# Patient Record
Sex: FEMALE | Race: WHITE | URBAN - METROPOLITAN AREA
[De-identification: names, ages, dates, MRNs, and addresses within clinical notes are randomized per-mention and may not be internally consistent; named-entity substitution may affect disease eponyms.]

---

## 2017-06-15 PROBLEM — R31.0 GROSS HEMATURIA: Status: ACTIVE | Noted: 2017-06-15

## 2019-04-17 ENCOUNTER — OFFICE VISIT (OUTPATIENT)
Dept: INTERNAL MEDICINE CLINIC | Age: 69
End: 2019-04-17

## 2019-04-17 VITALS
WEIGHT: 110 LBS | OXYGEN SATURATION: 98 % | SYSTOLIC BLOOD PRESSURE: 122 MMHG | HEART RATE: 60 BPM | DIASTOLIC BLOOD PRESSURE: 69 MMHG | BODY MASS INDEX: 21.6 KG/M2 | HEIGHT: 60 IN | TEMPERATURE: 98.2 F | RESPIRATION RATE: 18 BRPM

## 2019-04-17 DIAGNOSIS — M81.0 AGE-RELATED OSTEOPOROSIS WITHOUT CURRENT PATHOLOGICAL FRACTURE: ICD-10-CM

## 2019-04-17 DIAGNOSIS — J45.20 MILD INTERMITTENT ASTHMA WITHOUT COMPLICATION: ICD-10-CM

## 2019-04-17 DIAGNOSIS — R42 DIZZINESS: Primary | ICD-10-CM

## 2019-04-17 DIAGNOSIS — N20.0 CALCULUS OF KIDNEY: ICD-10-CM

## 2019-04-17 DIAGNOSIS — E78.5 DYSLIPIDEMIA: ICD-10-CM

## 2019-04-17 DIAGNOSIS — I25.10 CORONARY ARTERIOSCLEROSIS IN NATIVE ARTERY: ICD-10-CM

## 2019-04-17 DIAGNOSIS — R31.29 MICROHEMATURIA: ICD-10-CM

## 2019-04-17 NOTE — PROGRESS NOTES
Chief Complaint Patient presents with  Hypertension  Cholesterol Problem 1. Have you been to the ER, urgent care clinic since your last visit? Hospitalized since your last visit? No 
 
2. Have you seen or consulted any other health care providers outside of the [facility] since your last visit? Include any pap smears or colon screening. Yes When: Urology yesterday and Rheumatology last momth

## 2019-04-17 NOTE — PATIENT INSTRUCTIONS
Episode of dizziness>>ARRHYTHMIA>>EVENT RECORDER>>DR. PARKS Carotid Doppler will be arranged Need old records from [facility] and [id number] [facility] Need lab results

## 2019-04-17 NOTE — PROGRESS NOTES
HPI:  
 
This lady presents to the office to establish medical care without any records available for review. I queried [address] [place] [address] for information and rest of history provided by patient which was populated into EMR. Her medications were reconciled. She was recently seen by Urology for persistent painless microhematuria with findings of bilateral nephrolithiasis. She has been arranged for repeat renal ultrasound. Her prior stone analysis was consistent with CaOxalate and 2 of her sisters have problems with kidney stones as well. She had remote history of PCI/EULALIA to LAD and has been followed by Dr. Avelino Rocha. She denies any angina. The date of her last NST is not known. She reports recurring fleeing episodes of dizziness at rest not associated with diplopia, dysphagia, nausea, vomiting, ataxia, tinnitus or hearing loss. She was not aware of any palpitations. It was not worsened by movement. She remains on low dose MTX for RA and has been advised to remain on this by 2 different rheumatologists because of persistently elevated RF. She reports that her allergies have been stable along with her asthma in the absence of maintenance regimen. This usually flares when she visits [place] where they have a 2nd home. She is receiving Prolia for the treatment of osteoporosis and has supplemented this with D3 supplements and regular weight bearing exercise program. 
 
Past Medical History:  
Diagnosis Date  Asthma  Calculus of kidney  Coronary artery disease  Diverticulitis of colon  Esophageal reflux  Hypercholesterolemia  Hypertension  Kidney stone  Microhematuria 4/17/2019  Osteoarthritis  Renal cyst, left  Rheumatoid arthritis ([facility] [address] Past Surgical History:  
Procedure Laterality Date  HX CORONARY STENT PLACEMENT  10/07/2016  HX CYST REMOVAL    
 breast  
 HX LITHOTRIPSY  11/16/2016 URETEROSCOPY W/ LITHOTRIPSY  HX OTHER SURGICAL SHOULDER REPAIR  
 HX SHOULDER ARTHROSCOPY    
 HX TUBAL LIGATION    
 HX UROLOGICAL  02/24/2017 Dr. Ann Daniels, [facility], Cystoscopy, Left retrograde pyelogram, Left ureteroscopy and holmium laser lithotripsy with stone basket extraction, Left double-J stent placement Current Outpatient Medications Medication Sig  
 denosumab (PROLIA) 60 mg/mL injection 60 mg by SubCUTAneous route.  multivitamin (TAB-A-IESHA) tablet Take 1 Tab by Mouth Once a Day.  albuterol sulfate (PROVENTIL;VENTOLIN) 2.5 mg/0.5 mL nebu nebulizer solution Take  inhaled by mouth Take As Needed.  DIGOX 125 mcg tablet  atorvastatin (LIPITOR) 20 mg tablet 20 mg.  
 aspirin delayed-release 81 mg tablet Take  by mouth daily.  nitroglycerin (NITROSTAT) 0.4 mg SL tablet by SubLINGual route every five (5) minutes as needed for Chest Pain.  metoprolol tartrate (LOPRESSOR) 25 mg tablet 25 mg two (2) times a day.  methotrexate (RHEUMATREX) 2.5 mg tablet Take 2.5 mg by mouth.  folic acid (FOLVITE) 1 mg tablet Take  by mouth daily.  cholecalciferol (VITAMIN D3) 1,000 unit tablet 1,000 Units.  ALPRAZolam (XANAX) 0.25 mg tablet No current facility-administered medications for this visit. Allergies and Intolerances: Allergies Allergen Reactions  Epinephrine Other (comments) \"they gave it to me at the dentist's office and I got shaky and my blood pressure and heart rate went up\".  Tamsulosin Hives  
  itching 
itching Family History:  
Family History Problem Relation Age of Onset  Heart Disease Mother  Cancer Mother  Heart Disease Father MI  
 Other Other   
     kidney stone  Hypertension Brother  Heart Disease Sister Leyla Safer Sister Leyla Safer Sister  Heart Disease Brother  Drug Abuse Brother Social History: She  reports that she has never smoked. She has never used smokeless tobacco.  
Social History Substance and Sexual Activity Alcohol Use No  
 Alcohol/week: 0.0 oz Immunization History:       Prevnar, Pneumovax, Flu, Zostavax Diet:                                   Ornish Exercise:                           Yoga, Walking, Golf Home Environment:          Single story Occupational Risk:           No hazards Review of Systems Constitutional: Negative for chills, fever and weight loss. HENT: Positive for hearing loss. Eyes: Negative for blurred vision. Respiratory: Negative for cough, shortness of breath and wheezing. Cardiovascular: Negative for chest pain and palpitations. Gastrointestinal: Negative for abdominal pain, blood in stool, constipation, diarrhea and heartburn. Genitourinary: Negative. Musculoskeletal: Negative for joint pain. Skin: Negative for rash. Neurological: Positive for dizziness. Endo/Heme/Allergies: Negative for environmental allergies. Physical:  
Visit Vitals /69 Pulse 60 Temp 98.2 °F (36.8 °C) Resp 18 Ht 5' (1.524 m) Wt 110 lb (49.9 kg) SpO2 98% BMI 21.48 kg/m² Physical Exam  
Constitutional: She is well-developed, well-nourished, and in no distress. HENT:  
Mouth/Throat: Oropharynx is clear and moist. No oropharyngeal exudate. Eyes: Conjunctivae are normal. No scleral icterus. Neck: No JVD present. No thyromegaly present. Cardiovascular: Normal rate, regular rhythm, normal heart sounds and intact distal pulses. Pulmonary/Chest: Breath sounds normal. She has no wheezes. She has no rales. Abdominal: Soft. Bowel sounds are normal. She exhibits no mass. There is no tenderness. Musculoskeletal: She exhibits no edema. Lymphadenopathy:  
  She has no cervical adenopathy. Neurological: She is alert. She has normal strength. She displays no tremor. She has a normal Cerebellar Exam, a normal Finger-Nose-Finger Test and a normal Tandem Gait Test.  
Skin: Skin is warm and dry. Review of Data: 
Labs: Office Visit on 04/16/2019 Component Date Value Ref Range Status  Color (UA POC) 04/16/2019 Yellow   Final  
 Clarity (UA POC) 04/16/2019 Clear   Final  
 Glucose (UA POC) 04/16/2019 Negative  Negative Final  
 Bilirubin (UA POC) 04/16/2019 Negative  Negative Final  
 Ketones (UA POC) 04/16/2019 Negative  Negative Final  
 Specific gravity (UA POC) 04/16/2019 1.020  1.001 - 1.035 Final  
 Blood (UA POC) 04/16/2019 1+  Negative Final  
 pH (UA POC) 04/16/2019 7  4.6 - 8.0 Final  
 Protein (UA POC) 04/16/2019 Negative  Negative Final  
 Urobilinogen (UA POC) 04/16/2019 0.2 mg/dL  0.2 - 1 Final  
 Nitrites (UA POC) 04/16/2019 Negative  Negative Final  
 Leukocyte esterase (UA POC) 04/16/2019 Trace  Negative Final  
 
Impression/Plan: 
 Patient Active Problem List  
Diagnosis  Code  Microhematuria from recurrent stones Management per , determine if urine cytology done R31.1 Episodic dizziness with concern for arrhythmia Alert Dr. Ayleen Matthew as she will need event recorder, determine why she is on Dig R42  Coronary arteriosclerosis in native artery with PCI/EULALIA to LAD, without angina Risk reduction strategy in place, obtain copy of last NST I25.10  Dyslipidemia FLP results to be obtained E78.5  Sjogren's syndrome ([facility] [address] Management per Dr. Ray Rodriguez M35.00  Raynaud's disease secondary to RA Warming strategy I73.00  Ventricular tachycardia ([facility] [address] by history Obtain copy of prior Holter, echo and NST, determine if this occurred at time of MI, check K and Mg I47.2  Diverticulitis of colon Fluid and fiber, confirm date of last colonoscopy K57.32  
 Asthma/Allergies, stable off maintenance Caution regarding beta agonist inhaler (pro-arrhythmic) J45.909  Age related osteoporosis Continue Prolia, D3 and dietary calcium and exercise M81.0  
 Osteoarthritis Consider anti inflammatory supplement M19.90 Brayan Mancuso MD

## 2019-06-07 ENCOUNTER — OFFICE VISIT (OUTPATIENT)
Dept: INTERNAL MEDICINE CLINIC | Age: 69
End: 2019-06-07

## 2019-06-07 VITALS
TEMPERATURE: 98.4 F | RESPIRATION RATE: 18 BRPM | OXYGEN SATURATION: 99 % | BODY MASS INDEX: 21.6 KG/M2 | SYSTOLIC BLOOD PRESSURE: 113 MMHG | HEIGHT: 60 IN | HEART RATE: 60 BPM | WEIGHT: 110 LBS | DIASTOLIC BLOOD PRESSURE: 63 MMHG
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
## 2019-06-07 RX ORDER — DOXYCYCLINE 100 MG/1
100 CAPSULE ORAL 2 TIMES DAILY
Qty: 20 CAP | Refills: 0 | Status: SHIPPED | OUTPATIENT
Start: 2019-06-07 | End: 2019-08-27

## 2019-06-07 NOTE — PROGRESS NOTES
HPI:     This lady presents to the office on an urgent basis after discovery of a tick along inner thigh that was removed with tweezers 2 days ago by her . She noticed surrounding area of erythema and became concerned about possibility of Lyme. She took a picture of a partially engorged tick and I cannot identify any hallmarks as a result. She denies any fever, headache or arthralgias. She also reports that she had an episode of dizziness and activated the event recorder ordered by Dr. Charisma Pena and she has not heard from him but has an upcoming appointment to discuss this. Past Medical History:   Diagnosis Date    Asthma     Calculus of kidney     Coronary artery disease     Diverticulitis of colon     Esophageal reflux     Hypercholesterolemia     Hypertension     Kidney stone     Microhematuria 4/17/2019    Osteoarthritis     Renal cyst, left     Rheumatoid arthritis (HCC)      Past Surgical History:   Procedure Laterality Date    HX CORONARY STENT PLACEMENT  10/07/2016    HX CYST REMOVAL      breast    HX LITHOTRIPSY  11/16/2016    URETEROSCOPY W/ LITHOTRIPSY    HX OTHER SURGICAL      SHOULDER REPAIR    HX SHOULDER ARTHROSCOPY      HX TUBAL LIGATION      HX UROLOGICAL  02/24/2017    Dr. Antoine Romero, [facility], Cystoscopy, Left retrograde pyelogram, Left ureteroscopy and holmium laser lithotripsy with stone basket extraction, Left double-J stent placement      Current Outpatient Medications   Medication Sig    doxycycline (MONODOX) 100 mg capsule Take 1 Cap by mouth two (2) times a day. Indications: tick bite    denosumab (PROLIA) 60 mg/mL injection 60 mg by SubCUTAneous route.  multivitamin (TAB-A-IESHA) tablet Take 1 Tab by Mouth Once a Day.  albuterol sulfate (PROVENTIL;VENTOLIN) 2.5 mg/0.5 mL nebu nebulizer solution Take  inhaled by mouth Take As Needed.  DIGOX 125 mcg tablet     cholecalciferol (VITAMIN D3) 1,000 unit tablet 1,000 Units.     atorvastatin (LIPITOR) 20 mg tablet 20 mg.    aspirin delayed-release 81 mg tablet Take  by mouth daily.  nitroglycerin (NITROSTAT) 0.4 mg SL tablet by SubLINGual route every five (5) minutes as needed for Chest Pain.  metoprolol tartrate (LOPRESSOR) 25 mg tablet 25 mg two (2) times a day.  ALPRAZolam (XANAX) 0.25 mg tablet     methotrexate (RHEUMATREX) 2.5 mg tablet Take 2.5 mg by mouth.  folic acid (FOLVITE) 1 mg tablet Take  by mouth daily. No current facility-administered medications for this visit. Allergies and Intolerances: Allergies   Allergen Reactions    Epinephrine Other (comments)     \"they gave it to me at the dentist's office and I got shaky and my blood pressure and heart rate went up\".  Tamsulosin Hives     itching  itching     Family History:   Family History   Problem Relation Age of Onset    Heart Disease Mother     Cancer Mother     Heart Disease Father         MI    Other Other         kidney stone    Hypertension Brother     Heart Disease Sister    Waleska Fuelling Sister     Stone Sister     Heart Disease Brother     Drug Abuse Brother      Social History:   She  reports that she has never smoked. She has never used smokeless tobacco.   Social History     Substance and Sexual Activity   Alcohol Use No    Alcohol/week: 0.0 oz     Physical:   Visit Vitals  /63 (BP 1 Location: Left arm, BP Patient Position: Sitting)   Pulse 60   Temp 98.4 °F (36.9 °C) (Oral)   Resp 18   Ht 5' (1.524 m)   Wt 110 lb (49.9 kg)   SpO2 99%   BMI 21.48 kg/m²          Physical Exam   Skin:             Impression/Plan:   Patient Active Problem List   Diagnosis  Code    Tick bite Doxycycline 100 mg bid x 10 days, Lyme titer in 4 weeks, drug interaction  utilized, warned of potential side effect from Doxycycline (sun burn, GI upset, etc) W57. Crissie Pel    Dizziness likely arrhythmogenic Awaiting results of event recorder John Dey MD

## 2019-06-07 NOTE — PATIENT INSTRUCTIONS
Tick bite right inner thigh>>presumed lone star tick (partially engorged)>>Doxycycline 100mg twice a day for 10 days (may sprinkle contents into apple sauce) Check blood test for Lyme antibody in 4 weeks or greater

## 2019-06-07 NOTE — PROGRESS NOTES
Chief Complaint   Patient presents with    Tick Removal     tick was removal     1. Have you been to the ER, urgent care clinic since your last visit? Hospitalized since your last visit? No    2. Have you seen or consulted any other health care providers outside of the [address] [place] [address] since your last visit? Include any pap smears or colon screening.  No

## 2019-07-05 ENCOUNTER — HOSPITAL ENCOUNTER (OUTPATIENT)
Dept: LAB | Age: 69
Discharge: HOME | End: 2019-07-05
Payer: COMMERCIAL
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
## 2019-07-05 PROCEDURE — 86618 LYME DISEASE ANTIBODY: CPT

## 2019-07-05 PROCEDURE — 36415 COLL VENOUS BLD VENIPUNCTURE: CPT

## 2019-07-06 LAB — B BURGDOR IGG+IGM SER-ACNC: <0.91 ISR (ref 0–0.9)

## 2019-07-07 ENCOUNTER — TELEPHONE (OUTPATIENT)
Dept: INTERNAL MEDICINE CLINIC | Age: 69
End: 2019-07-07

## 2019-08-14 ENCOUNTER — HOSPITAL ENCOUNTER (OUTPATIENT)
Dept: LAB | Age: 69
Discharge: HOME | End: 2019-08-14
Payer: COMMERCIAL

## 2019-08-14 DIAGNOSIS — E78.5 DYSLIPIDEMIA: ICD-10-CM

## 2019-08-14 DIAGNOSIS — I25.10 CORONARY ARTERIOSCLEROSIS IN NATIVE ARTERY: ICD-10-CM

## 2019-08-14 DIAGNOSIS — R31.29 MICROHEMATURIA: ICD-10-CM

## 2019-08-14 DIAGNOSIS — M81.0 AGE-RELATED OSTEOPOROSIS WITHOUT CURRENT PATHOLOGICAL FRACTURE: Primary | ICD-10-CM

## 2019-08-14 DIAGNOSIS — M81.0 AGE-RELATED OSTEOPOROSIS WITHOUT CURRENT PATHOLOGICAL FRACTURE: ICD-10-CM

## 2019-08-14 LAB
25(OH)D3 SERPL-MCNC: 31.8 NG/ML (ref 30–100)
ALBUMIN SERPL-MCNC: 4 G/DL (ref 3.4–5)
ALBUMIN/GLOB SERPL: 1.5 {RATIO} (ref 0.8–1.7)
ALP SERPL-CCNC: 66 U/L (ref 45–117)
ALT SERPL-CCNC: 24 U/L (ref 13–56)
ANION GAP SERPL CALC-SCNC: 4 MMOL/L (ref 3–18)
APPEARANCE UR: CLEAR
AST SERPL-CCNC: 14 U/L (ref 10–38)
BACTERIA URNS QL MICRO: ABNORMAL /HPF
BILIRUB SERPL-MCNC: 0.6 MG/DL (ref 0.2–1)
BILIRUB UR QL: NEGATIVE
BUN SERPL-MCNC: 14 MG/DL (ref 7–18)
BUN/CREAT SERPL: 23 (ref 12–20)
CALCIUM SERPL-MCNC: 8.6 MG/DL (ref 8.5–10.1)
CHLORIDE SERPL-SCNC: 106 MMOL/L (ref 100–111)
CHOLEST SERPL-MCNC: 102 MG/DL
CK SERPL-CCNC: 28 U/L (ref 26–192)
CO2 SERPL-SCNC: 31 MMOL/L (ref 21–32)
COLOR UR: YELLOW
CREAT SERPL-MCNC: 0.62 MG/DL (ref 0.6–1.3)
EPITH CASTS URNS QL MICRO: ABNORMAL /LPF (ref 0–5)
ERYTHROCYTE [DISTWIDTH] IN BLOOD BY AUTOMATED COUNT: 13.2 % (ref 11.6–14.5)
GLOBULIN SER CALC-MCNC: 2.7 G/DL (ref 2–4)
GLUCOSE SERPL-MCNC: 91 MG/DL (ref 74–99)
GLUCOSE UR STRIP.AUTO-MCNC: NEGATIVE MG/DL
HCT VFR BLD AUTO: 42.4 % (ref 35–45)
HDLC SERPL-MCNC: 35 MG/DL (ref 40–60)
HDLC SERPL: 2.9 {RATIO} (ref 0–5)
HGB BLD-MCNC: 13.9 G/DL (ref 12–16)
HGB UR QL STRIP: ABNORMAL
KETONES UR QL STRIP.AUTO: NEGATIVE MG/DL
LDLC SERPL CALC-MCNC: 43.6 MG/DL (ref 0–100)
LEUKOCYTE ESTERASE UR QL STRIP.AUTO: ABNORMAL
LIPID PROFILE,FLP: ABNORMAL
MCH RBC QN AUTO: 31.4 PG (ref 24–34)
MCHC RBC AUTO-ENTMCNC: 32.8 G/DL (ref 31–37)
MCV RBC AUTO: 95.7 FL (ref 74–97)
NITRITE UR QL STRIP.AUTO: NEGATIVE
PH UR STRIP: 8.5 [PH] (ref 5–8)
PLATELET # BLD AUTO: 198 K/UL (ref 135–420)
PMV BLD AUTO: 13.1 FL (ref 9.2–11.8)
POTASSIUM SERPL-SCNC: 4.2 MMOL/L (ref 3.5–5.5)
PROT SERPL-MCNC: 6.7 G/DL (ref 6.4–8.2)
PROT UR STRIP-MCNC: ABNORMAL MG/DL
RBC # BLD AUTO: 4.43 M/UL (ref 4.2–5.3)
RBC #/AREA URNS HPF: ABNORMAL /HPF (ref 0–5)
SODIUM SERPL-SCNC: 141 MMOL/L (ref 136–145)
SP GR UR REFRACTOMETRY: 1.01 (ref 1–1.03)
TRIGL SERPL-MCNC: 117 MG/DL (ref ?–150)
TSH SERPL DL<=0.05 MIU/L-ACNC: 4.06 UIU/ML (ref 0.36–3.74)
UROBILINOGEN UR QL STRIP.AUTO: 1 EU/DL (ref 0.2–1)
VLDLC SERPL CALC-MCNC: 23.4 MG/DL
WBC # BLD AUTO: 6 K/UL (ref 4.6–13.2)
WBC URNS QL MICRO: ABNORMAL /HPF (ref 0–4)

## 2019-08-14 PROCEDURE — 80061 LIPID PANEL: CPT

## 2019-08-14 PROCEDURE — 85027 COMPLETE CBC AUTOMATED: CPT

## 2019-08-14 PROCEDURE — 84443 ASSAY THYROID STIM HORMONE: CPT

## 2019-08-14 PROCEDURE — 82306 VITAMIN D 25 HYDROXY: CPT

## 2019-08-14 PROCEDURE — 36415 COLL VENOUS BLD VENIPUNCTURE: CPT

## 2019-08-14 PROCEDURE — 80053 COMPREHEN METABOLIC PANEL: CPT

## 2019-08-14 PROCEDURE — 82550 ASSAY OF CK (CPK): CPT

## 2019-08-14 PROCEDURE — 81001 URINALYSIS AUTO W/SCOPE: CPT

## 2019-08-27 ENCOUNTER — OFFICE VISIT (OUTPATIENT)
Dept: INTERNAL MEDICINE CLINIC | Age: 69
End: 2019-08-27

## 2019-08-27 VITALS
HEIGHT: 60 IN | SYSTOLIC BLOOD PRESSURE: 100 MMHG | RESPIRATION RATE: 18 BRPM | WEIGHT: 107 LBS | DIASTOLIC BLOOD PRESSURE: 58 MMHG | HEART RATE: 54 BPM | BODY MASS INDEX: 21.01 KG/M2 | OXYGEN SATURATION: 98 % | TEMPERATURE: 98 F

## 2019-08-27 DIAGNOSIS — Z00.00 ENCOUNTER FOR PREVENTIVE HEALTH EXAMINATION: Primary | ICD-10-CM

## 2019-08-27 DIAGNOSIS — I25.10 CORONARY ARTERIOSCLEROSIS IN NATIVE ARTERY: ICD-10-CM

## 2019-08-27 DIAGNOSIS — I73.00 RAYNAUD'S DISEASE WITHOUT GANGRENE: ICD-10-CM

## 2019-08-27 PROBLEM — M05.741 RHEUMATOID ARTHRITIS INVOLVING BOTH HANDS WITH POSITIVE RHEUMATOID FACTOR (HCC): Status: ACTIVE | Noted: 2019-08-27

## 2019-08-27 PROBLEM — M05.742 RHEUMATOID ARTHRITIS INVOLVING BOTH HANDS WITH POSITIVE RHEUMATOID FACTOR (HCC): Status: ACTIVE | Noted: 2019-08-27
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
## 2019-08-27 PROBLEM — E03.8 SUBCLINICAL HYPOTHYROIDISM: Status: ACTIVE | Noted: 2019-08-27

## 2019-08-27 PROBLEM — I47.1 PSVT (PAROXYSMAL SUPRAVENTRICULAR TACHYCARDIA) (HCC): Status: ACTIVE | Noted: 2019-08-27

## 2019-08-27 RX ORDER — LANOLIN ALCOHOL/MO/W.PET/CERES
1000 CREAM (GRAM) TOPICAL DAILY
COMMUNITY

## 2019-08-27 NOTE — PATIENT INSTRUCTIONS
PSVT>>controlled on Digoxin    CAD>>STENT TO LAD>>monitor for angina    Cholesterol nearly perfect except HDL is low (oatmeal, salmon, almonds)    BP controlled    RA/Sjogren's/Raynauds>>Dr. Ramón Ochoa    Thyroid (borderline low) >> repeat TSH in 6 months with T4 and TPO Antibody (Hashimoto's)    Kidney stone>>water with lemon    Screening:  Up to date on mammogram, bone density, colonoscopy    Immunizations:   High dose flu vaccine in October, Shingrix

## 2019-08-27 NOTE — PROGRESS NOTES
Chief Complaint   Patient presents with    Hypertension    Cholesterol Problem       1. Have you been to the ER, urgent care clinic since your last visit? Hospitalized since your last visit? No    2. Have you seen or consulted any other health care providers outside of the [address] [place] [address] since your last visit? Include any pap smears or colon screening.  No

## 2019-08-27 NOTE — PROGRESS NOTES
HPI:     This lady presents to the office for her annual wellness visit. She had labs drawn prior to this visit which she wanted to discuss. She is under the care of Dr. Kathy Whiting for PSVT and is currently on Digoxin with good results. She also has previously been diagnosed with VT that occurred at time of ACS which does not appear to have recurred since PCI/EULALIA to LAD and diagonal bifurcation in 2016. She is tolerating Lipitor although I am not certain that her worsening arthralgias is related to the use of Lipitor. I did not check CPK with the most recent blood work. She is being followed by Dr. Wade Sher for RA and Sjogren's and remains on minimal dose of MTX. She complains of stiffness and pain in fingers of both hands especially in cold environment (visited [place]). She completed treatment for tick bite with Lyme antibody titer done after 4 weeks that was negative. She never developed bullseye rash, headache or fever. She is receiving Prolia under the direction of Dr. Wade Sher for treatment of osteoporosis and has supplemented this with weight bearing exercise program. She is taking a daily MVI but not D3 supplement. She has been evaluated by Dr. Venus Arguelles for hematuria ascribed to urolithiasis. She had cystoscopic stone extraction performed in February 2017. Her urine cytology was negative. I do not have a copy of the cytoscopy report. She is currently voiding well.     Past Medical History:   Diagnosis Date    Asthma     Calculus of kidney     Coronary artery disease     Diverticulitis of colon     Esophageal reflux     Hypercholesterolemia     Hypertension     Kidney stone     Microhematuria 4/17/2019    Osteoarthritis     Renal cyst, left     Rheumatoid arthritis ([address]     Tick bite 6/7/2019     Past Surgical History:   Procedure Laterality Date    HX CORONARY STENT PLACEMENT  10/07/2016    HX CYST REMOVAL      breast    HX LITHOTRIPSY  11/16/2016    URETEROSCOPY W/ LITHOTRIPSY  HX OTHER SURGICAL      SHOULDER REPAIR    HX SHOULDER ARTHROSCOPY      HX TUBAL LIGATION      HX UROLOGICAL  02/24/2017    Dr. Willie Leiva, [facility], Cystoscopy, Left retrograde pyelogram, Left ureteroscopy and holmium laser lithotripsy with stone basket extraction, Left double-J stent placement      Current Outpatient Medications   Medication Sig    cyanocobalamin (VITAMIN B-12) 1,000 mcg tablet Take 1,000 mcg by mouth daily.  denosumab (PROLIA) 60 mg/mL injection 60 mg by SubCUTAneous route.  multivitamin (TAB-A-IESHA) tablet Take 1 Tab by Mouth Once a Day.  DIGOX 125 mcg tablet     atorvastatin (LIPITOR) 20 mg tablet 20 mg.    aspirin delayed-release 81 mg tablet Take  by mouth daily.  metoprolol tartrate (LOPRESSOR) 25 mg tablet 25 mg two (2) times a day.  ALPRAZolam (XANAX) 0.25 mg tablet     methotrexate (RHEUMATREX) 2.5 mg tablet Take 2.5 mg by mouth.  folic acid (FOLVITE) 1 mg tablet Take  by mouth daily.  cholecalciferol (VITAMIN D3) 1,000 unit tablet 1,000 Units.  nitroglycerin (NITROSTAT) 0.4 mg SL tablet by SubLINGual route every five (5) minutes as needed for Chest Pain. No current facility-administered medications for this visit. Allergies and Intolerances: Allergies   Allergen Reactions    Epinephrine Other (comments)     \"they gave it to me at the dentist's office and I got shaky and my blood pressure and heart rate went up\".  Tamsulosin Hives     itching  itching     Family History:   Family History   Problem Relation Age of Onset    Heart Disease Mother     Cancer Mother     Heart Disease Father         MI    Other Other         kidney stone    Hypertension Brother     Heart Disease Sister    Daphnie Rod Sister     Stone Sister     Heart Disease Brother     Drug Abuse Brother      Social History:   She  reports that she has never smoked.  She has never used smokeless tobacco.   Social History     Substance and Sexual Activity   Alcohol Use No    Alcohol/week: 0.0 standard drinks     Immunization History:           Prevnar, Pneumovax and Zostavax received    Diet:                                       Ornish    Exercise:                                Golf, Yoga, Walk    Home Environment:               One story home    Screening:                              Mammogram 2019, DEXA 2020, Colonoscopy 2020    Review of Systems   Constitutional: Positive for weight loss. HENT: Negative for hearing loss. Eyes: Negative for blurred vision. Cardiovascular: Positive for palpitations. Negative for chest pain. Gastrointestinal: Negative for blood in stool and heartburn. Genitourinary: Negative for frequency and urgency. Musculoskeletal: Positive for joint pain. Neurological: Negative for dizziness and tingling. Psychiatric/Behavioral: Negative for depression and memory loss. The patient does not have insomnia. Physical:   Visit Vitals  /58   Pulse (!) 54   Temp 98 °F (36.7 °C) (Oral)   Resp 18   Ht 5' (1.524 m)   Wt 107 lb (48.5 kg)   SpO2 98%   BMI 20.90 kg/m²          Physical Exam   Constitutional: She is well-developed, well-nourished, and in no distress. HENT:   Right Ear: External ear normal.   Left Ear: External ear normal.   Mouth/Throat: Oropharynx is clear and moist.   Eyes: Conjunctivae are normal. No scleral icterus. Neck: No JVD present. Cardiovascular: Normal rate, regular rhythm, normal heart sounds and intact distal pulses. Pulmonary/Chest: Effort normal and breath sounds normal.   Abdominal: Soft. Bowel sounds are normal. She exhibits no mass. There is no tenderness. Musculoskeletal: She exhibits no edema. No synovitis   Lymphadenopathy:     She has no cervical adenopathy. She has no axillary adenopathy. Neurological: She is alert. She has normal strength and intact cranial nerves. Gait normal.   MMSE 30/30   Skin: No cyanosis. Nails show no clubbing.    Tattoo left leg   Psychiatric: Mood, memory and affect normal.        Review of Data:  Labs:  Hospital Outpatient Visit on 08/14/2019   Component Date Value Ref Range Status    WBC 08/14/2019 6.0  4.6 - 13.2 K/uL Final    RBC 08/14/2019 4.43  4.20 - 5.30 M/uL Final    HGB 08/14/2019 13.9  12.0 - 16.0 g/dL Final    HCT 08/14/2019 42.4  35.0 - 45.0 % Final    MCV 08/14/2019 95.7  74.0 - 97.0 FL Final    MCH 08/14/2019 31.4  24.0 - 34.0 PG Final    MCHC 08/14/2019 32.8  31.0 - 37.0 g/dL Final    RDW 08/14/2019 13.2  11.6 - 14.5 % Final    PLATELET 41/12/7034 706  135 - 420 K/uL Final    MPV 08/14/2019 13.1* 9.2 - 11.8 FL Final    LIPID PROFILE 08/14/2019        Final    Cholesterol, total 08/14/2019 102  <200 MG/DL Final    Triglyceride 08/14/2019 117  <150 MG/DL Final    HDL Cholesterol 08/14/2019 35* 40 - 60 MG/DL Final    LDL, calculated 08/14/2019 43.6  0 - 100 MG/DL Final    VLDL, calculated 08/14/2019 23.4  MG/DL Final    CHOL/HDL Ratio 08/14/2019 2.9  0 - 5.0   Final    Sodium 08/14/2019 141  136 - 145 mmol/L Final    Potassium 08/14/2019 4.2  3.5 - 5.5 mmol/L Final    Chloride 08/14/2019 106  100 - 111 mmol/L Final    CO2 08/14/2019 31  21 - 32 mmol/L Final    Anion gap 08/14/2019 4  3.0 - 18 mmol/L Final    Glucose 08/14/2019 91  74 - 99 mg/dL Final    BUN 08/14/2019 14  7.0 - 18 MG/DL Final    Creatinine 08/14/2019 0.62  0.6 - 1.3 MG/DL Final    BUN/Creatinine ratio 08/14/2019 23* 12 - 20   Final    GFR est AA 08/14/2019 >60  >60 ml/min/1.73m2 Final    GFR est non-AA 08/14/2019 >60  >60 ml/min/1.73m2 Final    Calcium 08/14/2019 8.6  8.5 - 10.1 MG/DL Final    Bilirubin, total 08/14/2019 0.6  0.2 - 1.0 MG/DL Final    ALT (SGPT) 08/14/2019 24  13 - 56 U/L Final    AST (SGOT) 08/14/2019 14  10 - 38 U/L Final    Alk.  phosphatase 08/14/2019 66  45 - 117 U/L Final    Protein, total 08/14/2019 6.7  6.4 - 8.2 g/dL Final    Albumin 08/14/2019 4.0  3.4 - 5.0 g/dL Final    Globulin 08/14/2019 2.7  2.0 - 4.0 g/dL Final    A-G Ratio 08/14/2019 1.5  0.8 - 1.7   Final    TSH 08/14/2019 4.06* 0.36 - 3.74 uIU/mL Final    Color 08/14/2019 YELLOW    Final    Appearance 08/14/2019 CLEAR    Final    Specific gravity 08/14/2019 1.013  1.005 - 1.030   Final    pH (UA) 08/14/2019 8.5* 5.0 - 8.0   Final    Protein 08/14/2019 TRACE* NEG mg/dL Final    Glucose 08/14/2019 NEGATIVE   NEG mg/dL Final    Ketone 08/14/2019 NEGATIVE   NEG mg/dL Final    Bilirubin 08/14/2019 NEGATIVE   NEG   Final    Blood 08/14/2019 SMALL* NEG   Final    Urobilinogen 08/14/2019 1.0  0.2 - 1.0 EU/dL Final    Nitrites 08/14/2019 NEGATIVE   NEG   Final    Leukocyte Esterase 08/14/2019 SMALL* NEG   Final    WBC 08/14/2019 8 to 10  0 - 4 /hpf Final    RBC 08/14/2019 6 to 8  0 - 5 /hpf Final    Epithelial cells 08/14/2019 FEW  0 - 5 /lpf Final    Bacteria 08/14/2019 FEW* NEG /hpf Final    Vitamin D 25-Hydroxy 08/14/2019 31.8  30 - 100 ng/mL Final    CK 08/14/2019 28  26 - 192 U/L Final   Hospital Outpatient Visit on 07/05/2019   Component Date Value Ref Range Status    Lyme Ab, IgG/IgM 07/05/2019 <0.91  0.00 - 0.90 ISR Final       Impression/Plan:   Patient Active Problem List   Diagnosis  Code    Screening/Wellness Confirm screening services, high dose flu vaccine in October and Shingrix recommended   N28.1    CAD with EULALIA to LAD and diagonal branch 2016 without angina recurrence Risk reduction strategy in place and continue follow up with Dr. Nilsa Jeffery.    I25.10    PSVT suppressed on Digoxin Management as outlined by Dr. Félix Barkley, obtain copy of echocardiogram   I47.2    Dyslipidemia with low HDL No additional cardioprotection with addition of Niaspan when target LDL achieved   E78.5    Rheumatoid Arthritis Management per Dr. Es Grullon   M05.741    Sjogren's syndrome [facility]) Management per Dr. Es Grullon   M35.00    Raynaud's disease secondary to above Warming strategy, consider NTG cream   I73.00    Calculus of kidney with prior ESWL Acidulated fluids, obtain stone analysis report   N20.0    Asthma stable off medication Monitor clinically, consider ICS or Singulair for maintenance   J45.909    Subclinical hypothyroidism Check TSH with TPO antibody next visit   E03.9    Age related osteoporosis Prolia, dietary calcium, D3 supplement, weight bearing M81.0             Blanche Sher MD

## 2021-09-20 PROBLEM — I49.8 ATRIAL ARRHYTHMIA: Status: ACTIVE | Noted: 2021-09-20

## 2021-09-20 PROBLEM — E78.00 HYPERCHOLESTEROLEMIA: Status: ACTIVE | Noted: 2021-09-20

## 2021-09-20 PROBLEM — I10 HYPERTENSIVE DISORDER: Status: ACTIVE | Noted: 2021-09-20

## 2021-09-20 PROBLEM — K21.9 GASTROESOPHAGEAL REFLUX DISEASE: Status: ACTIVE | Noted: 2021-09-20

## 2021-09-20 PROBLEM — M72.0 DUPUYTREN'S DISEASE: Status: ACTIVE | Noted: 2021-09-20

## 2021-09-20 PROBLEM — K57.30 DIVERTICULOSIS OF COLON: Status: ACTIVE | Noted: 2021-09-20

## 2021-10-26 LAB — EF %, EXTERNAL: NORMAL
